# Patient Record
Sex: MALE | Race: WHITE | Employment: FULL TIME | URBAN - METROPOLITAN AREA
[De-identification: names, ages, dates, MRNs, and addresses within clinical notes are randomized per-mention and may not be internally consistent; named-entity substitution may affect disease eponyms.]

---

## 2020-06-03 ENCOUNTER — TELEPHONE (OUTPATIENT)
Dept: CARDIOLOGY CLINIC | Age: 20
End: 2020-06-03

## 2020-07-23 ENCOUNTER — OFFICE VISIT (OUTPATIENT)
Dept: CARDIOLOGY CLINIC | Age: 20
End: 2020-07-23
Payer: COMMERCIAL

## 2020-07-23 VITALS
SYSTOLIC BLOOD PRESSURE: 138 MMHG | HEART RATE: 94 BPM | OXYGEN SATURATION: 99 % | DIASTOLIC BLOOD PRESSURE: 60 MMHG | WEIGHT: 161 LBS

## 2020-07-23 PROCEDURE — 99204 OFFICE O/P NEW MOD 45 MIN: CPT | Performed by: INTERNAL MEDICINE

## 2020-07-23 RX ORDER — AMLODIPINE BESYLATE 10 MG/1
10 TABLET ORAL DAILY
COMMUNITY
Start: 2020-05-22

## 2020-07-23 RX ORDER — AMILORIDE HYDROCHLORIDE 5 MG/1
5 TABLET ORAL 2 TIMES DAILY
COMMUNITY
Start: 2020-05-22

## 2020-07-23 RX ORDER — LISINOPRIL 10 MG/1
10 TABLET ORAL DAILY
COMMUNITY

## 2020-07-23 NOTE — PROGRESS NOTES
Ov DR Enrique Music consult  HTN since senior in school   Sees  [place] [address] syndrome  Since 2016. 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 Did drink more caffeine   At that time  Cut down now. No chest pain or sob  No dizziness. Since little had heart   Murmur, bicuspid   Aortic valve. No family heart problems  Has 2 sisters   Age 24 twin going to [employer] for accounting and age 25  Will be Going to college for  dental hygienists   Doesn't drink or smoke. Works at [employer]. Pt was going to college   But then started this job. Father works MTD   Mother works   DR Asad Mao   In billing. Bedside echo done.  talked with mom   On phone in room. Will see in 1 year with   Echo at [facility].

## 2020-07-28 NOTE — PROGRESS NOTES
Artis Rice M.D. [address] [place] [address]  [phone]        2020        Celina Orr MD  [facility] [address]  [place] [address]    RE:   Crystal Sharpe  :  2000    Dear Dr. Cassius Gonzalez:    CHIEF COMPLAINT:  1. Possible bicuspid aortic valve. 2.  Hypertension. 3.  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 HISTORY OF PRESENT ILLNESS:  I had the pleasure of seeing Crystal Sharpe in our office on 2020. He is a pleasant 21-year-old gentleman, who has a long history of hypertension. He had been followed by Dr. Omar Gillette in [place], who is a pediatric cardiologist.  He has a long history of hypertension. He also had mild conduction abnormalities with first-degree and second-degree type 1 AV block. He has a history of a possible bicuspid aortic valve. This had been noted when he was 7or 5years old. However, another echocardiogram when he was 11 or 16, was felt to be not bicuspid aortic valve. He was seen by Dr. Danita Ngo on 2016, and at that time, he recommended a nephrology workup. He was seen on 2016, by Dr. Faisal Grace, a pediatric nephrologist.  He recommended limiting his salt to 2500 mg a day. He had a full renal workup by Dr. Faisal Grace. 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 He scheduled a renal ultrasound, which was unremarkable. He had a followup visit with Dr. Faisal Grace at [facility].  His evaluation had showed a low to normal potassium, low renin and low aldosterone level, all consistent with Liddle syndrome. Echocardiogram did not show LVH and there was no evidence of a bicuspid aortic valve. He was started on amiloride 5 mg daily on 2016. On 2018, he established with Dr. Cheyenne Collins for his Liddle syndrome. He was placed also on amlodipine 10 mg and 15 mg daily. He is asking to see me to follow his cardiac care.   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 He did have an echocardiogram on 05/23/2020, in [facility], and at that time, it was read as a probable bicuspid aortic valve. There was trace mitral and tricuspid regurgitation. His ejection fraction was normal at 65%. He was seen in the emergency room on 05/23/2020, for heart palpitations. He had been working in the [employer]. 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 It started several months ago. At that time, he was on the road working and training. He was working on the bridge with a large crane. His palpitations worsened at that time. When I reproduced the sound, it appeared that he was having single extrasystoles. 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 He likes this job, and again, he feels better with less palpitations. He denies any chest pain or chest discomfort. He has had no PND, orthopnea and no unusual pedal edema. His blood pressure has been under fairly good control. He has had no syncope or near syncope. No chest pain or chest discomfort and no dizziness. CARDIAC RISK FACTORS:  Hypertension:  Positive. Other Family Members:  Negative. Smoking:  Negative. Peripheral Vascular Disease:  Negative. Diabetes:  Negative. Hyperlipidemia:  Negative. MEDICATIONS AT HOME:  He is currently on amiloride 5 mg b.i.d., Norvasc 10 mg daily, lisinopril 10 mg daily. PAST MEDICAL HISTORY:  1. He has a history of Liddle syndrome. 2.  He has had hypertension since he was 14years old. 3.  He was told he had a bicuspid aortic valve when he was young, 7or 5years old, and at 14, he was told he did not have a bicuspid aortic valve, and on his last echo in 05/2020 read at [facility] suggested that he did have bicuspid aortic valve. 4.  He has hypertensive retinopathy.   Lisinopril was started in for allowing me the privilege of seeing Miguel Stout. If you have any questions on my thoughts, please do not hesitate to contact me.      Sincerely,      Kun Salgado    D: 07/26/2020 22:12:34     T: 07/27/2020 2:42:59     [email or web address]  Job#: [id number]   Doc#: [id number]

## 2021-06-08 DIAGNOSIS — Q23.1 BICUSPID AORTIC VALVE: ICD-10-CM

## 2021-07-09 DIAGNOSIS — Q23.1 BICUSPID AORTIC VALVE: ICD-10-CM

## 2021-07-09 DIAGNOSIS — R00.2 HEART PALPITATIONS: ICD-10-CM

## 2021-07-09 DIAGNOSIS — R53.83 FATIGUE, UNSPECIFIED TYPE: ICD-10-CM

## 2021-07-09 DIAGNOSIS — E55.9 VITAMIN D DEFICIENCY: ICD-10-CM

## 2021-07-09 DIAGNOSIS — I10 HYPERTENSION, UNSPECIFIED TYPE: ICD-10-CM

## 2021-07-10 ENCOUNTER — HOSPITAL ENCOUNTER (OUTPATIENT)
Age: 21
Discharge: HOME | End: 2021-07-10
Payer: COMMERCIAL

## 2021-07-10 DIAGNOSIS — Q23.1 BICUSPID AORTIC VALVE: ICD-10-CM

## 2021-07-10 DIAGNOSIS — E55.9 VITAMIN D DEFICIENCY: ICD-10-CM

## 2021-07-10 DIAGNOSIS — I10 HYPERTENSION, UNSPECIFIED TYPE: ICD-10-CM

## 2021-07-10 DIAGNOSIS — R53.83 FATIGUE, UNSPECIFIED TYPE: ICD-10-CM

## 2021-07-10 DIAGNOSIS — R00.2 HEART PALPITATIONS: ICD-10-CM

## 2021-07-10 LAB
ABSOLUTE EOS #: 0.1 K/UL (ref 0–0.4)
ABSOLUTE IMMATURE GRANULOCYTE: NORMAL K/UL (ref 0–0.3)
ABSOLUTE LYMPH #: 2.9 K/UL (ref 1–4.8)
ABSOLUTE MONO #: 0.5 K/UL (ref 0–1)
ALBUMIN SERPL-MCNC: 4.6 G/DL (ref 3.5–5.2)
ALBUMIN/GLOBULIN RATIO: ABNORMAL (ref 1–2.5)
ALP BLD-CCNC: 110 U/L (ref 40–129)
ALT SERPL-CCNC: 49 U/L (ref 5–41)
ANION GAP SERPL CALCULATED.3IONS-SCNC: 8 MMOL/L (ref 9–17)
AST SERPL-CCNC: 26 U/L
BASOPHILS # BLD: 0 % (ref 0–2)
BASOPHILS ABSOLUTE: 0 K/UL (ref 0–0.2)
BILIRUB SERPL-MCNC: 0.39 MG/DL (ref 0.3–1.2)
BUN BLDV-MCNC: 17 MG/DL (ref 6–20)
BUN/CREAT BLD: 16 (ref 9–20)
CALCIUM SERPL-MCNC: 9.7 MG/DL (ref 8.6–10.4)
CHLORIDE BLD-SCNC: 107 MMOL/L (ref 98–107)
CHOLESTEROL/HDL RATIO: 2.7
CHOLESTEROL: 128 MG/DL
CO2: 26 MMOL/L (ref 20–31)
CREAT SERPL-MCNC: 1.05 MG/DL (ref 0.7–1.2)
DIFFERENTIAL TYPE: YES
EOSINOPHILS RELATIVE PERCENT: 2 % (ref 0–5)
GFR AFRICAN AMERICAN: >60 ML/MIN
GFR NON-AFRICAN AMERICAN: >60 ML/MIN
GFR SERPL CREATININE-BSD FRML MDRD: ABNORMAL ML/MIN/{1.73_M2}
GFR SERPL CREATININE-BSD FRML MDRD: ABNORMAL ML/MIN/{1.73_M2}
GLUCOSE BLD-MCNC: 93 MG/DL (ref 70–99)
HCT VFR BLD CALC: 45.4 % (ref 41–53)
HDLC SERPL-MCNC: 48 MG/DL
HEMOGLOBIN: 16 G/DL (ref 13.5–17.5)
IMMATURE GRANULOCYTES: NORMAL %
LDL CHOLESTEROL: 66 MG/DL (ref 0–130)
LYMPHOCYTES # BLD: 41 % (ref 13–44)
MAGNESIUM: 1.9 MG/DL (ref 1.6–2.6)
MCH RBC QN AUTO: 30.2 PG (ref 26–34)
MCHC RBC AUTO-ENTMCNC: 35.1 G/DL (ref 31–37)
MCV RBC AUTO: 86 FL (ref 80–100)
MONOCYTES # BLD: 7 % (ref 5–9)
NRBC AUTOMATED: NORMAL PER 100 WBC
PATIENT FASTING?: YES
PDW BLD-RTO: 12.8 % (ref 12.1–15.2)
PLATELET # BLD: 214 K/UL (ref 140–450)
PLATELET ESTIMATE: NORMAL
PMV BLD AUTO: NORMAL FL (ref 6–12)
POTASSIUM SERPL-SCNC: 4.5 MMOL/L (ref 3.7–5.3)
RBC # BLD: 5.28 M/UL (ref 4.5–5.9)
RBC # BLD: NORMAL 10*6/UL
SEG NEUTROPHILS: 50 % (ref 39–75)
SEGMENTED NEUTROPHILS ABSOLUTE COUNT: 3.5 K/UL (ref 2.1–6.5)
SODIUM BLD-SCNC: 141 MMOL/L (ref 135–144)
TOTAL PROTEIN: 7.5 G/DL (ref 6.4–8.3)
TRIGL SERPL-MCNC: 68 MG/DL
TSH SERPL DL<=0.05 MIU/L-ACNC: 1.08 MIU/L (ref 0.3–5)
VITAMIN D 25-HYDROXY: 40.1 NG/ML (ref 30–100)
VLDLC SERPL CALC-MCNC: NORMAL MG/DL (ref 1–30)
WBC # BLD: 7 K/UL (ref 3.5–11)
WBC # BLD: NORMAL 10*3/UL

## 2021-07-10 PROCEDURE — 85025 COMPLETE CBC W/AUTO DIFF WBC: CPT

## 2021-07-10 PROCEDURE — 84443 ASSAY THYROID STIM HORMONE: CPT

## 2021-07-10 PROCEDURE — 80053 COMPREHEN METABOLIC PANEL: CPT

## 2021-07-10 PROCEDURE — 80061 LIPID PANEL: CPT

## 2021-07-10 PROCEDURE — 36415 COLL VENOUS BLD VENIPUNCTURE: CPT

## 2021-07-10 PROCEDURE — 83735 ASSAY OF MAGNESIUM: CPT

## 2021-07-10 PROCEDURE — 82306 VITAMIN D 25 HYDROXY: CPT

## 2021-07-12 DIAGNOSIS — R53.83 FATIGUE, UNSPECIFIED TYPE: ICD-10-CM

## 2021-07-12 DIAGNOSIS — R00.2 HEART PALPITATIONS: ICD-10-CM

## 2021-07-12 DIAGNOSIS — I10 HYPERTENSION, UNSPECIFIED TYPE: ICD-10-CM

## 2021-07-12 DIAGNOSIS — Q23.1 BICUSPID AORTIC VALVE: ICD-10-CM

## 2021-07-12 DIAGNOSIS — E55.9 VITAMIN D DEFICIENCY: ICD-10-CM

## 2021-07-15 ENCOUNTER — OFFICE VISIT (OUTPATIENT)
Dept: CARDIOLOGY CLINIC | Age: 21
End: 2021-07-15
Payer: COMMERCIAL

## 2021-07-15 VITALS
SYSTOLIC BLOOD PRESSURE: 130 MMHG | HEART RATE: 81 BPM | WEIGHT: 163 LBS | DIASTOLIC BLOOD PRESSURE: 70 MMHG | OXYGEN SATURATION: 98 %

## 2021-07-15 DIAGNOSIS — I10 ESSENTIAL HYPERTENSION: Primary | ICD-10-CM

## 2021-07-15 PROCEDURE — 99214 OFFICE O/P EST MOD 30 MIN: CPT | Performed by: INTERNAL MEDICINE

## 2021-07-15 RX ORDER — BUSPIRONE HYDROCHLORIDE 10 MG/1
10 TABLET ORAL 2 TIMES DAILY
COMMUNITY

## 2021-07-15 NOTE — PROGRESS NOTES
Ov Dr. Mary Garza for one year follow up   No hospitalizations/procedures/er visits  occ palpitations comes and goes anytime   No chest pain or heaviness  No sob   Still at [employer]   Twin Qian Soto still at [employer]   Other Sister not in dental hygienist now but   accounting-[employer]- Norval Blizzard is his girlfriend -in college -?dental hygienist   Had reflux the other day/throat hurt had a little amount of blood come up       No changes    Follow up in one year

## 2021-07-20 NOTE — PROGRESS NOTES
Evangelina Ahumada, M.D. [address] [place] [address]  [phone]    July 15, 2021    MD Anna Amoss [address]  [facility], [place] [address]    RE:   Rajwinder Varner  :  2000      Dear Dr. Gia Rashid:    CHIEF COMPLAINT:  1. History of possible bicuspid aortic valve. although by echocardiogram at [facility] in [facility] on 2021, states there is \"well-visualized trileaflet aortic valve without stenosis or insufficiency\" with an EF of 69% and normal right heart chambers. 2.  Liddle syndrome, followed by Dr. Theresa Charles. HISTORY OF PRESENT ILLNESS:  I had the pleasure of seeing Rajwinder Varner in our office on 07/15/2021. He is a 79-year-old gentleman who has a long history of hypertension. He had been followed by Dr. Elizabet Hernandez in [place], who is a cardiologist.  He has intermittent first-degree block and second-degree type 1 Wenckebach AV block. He also has a history of a possible bicuspid aortic valve. This had been noted when he was 7or 5years old; however, on an echocardiogram when he was 11 or 15, it was felt to be a trileaflet valve. He had a renal workup by Dr. Man Haji and felt that he had Liddle syndrome (low PRA and aldosterone). He has a low to normal potassium, and low renin and low aldosterone levels. He is now followed by Dr. Theresa Charles. He has been on amlodipine for his hypertension. I saw him on 2020. At that time, his blood pressure was well controlled. He had a systolic ejection murmur in the aortic region, which was a flow murmur. I felt that he probably had a bicuspid valve. Again, I sent him for an echocardiogram at [facility] in [facility] in preparation for his yearly visit today. His echocardiogram was read as normal LV function, with an EF of 69%, with normal chamber sizes. Again, he had a \"well-visualized trileaflet aortic valve without stenosis or insufficiency. \"    He has done well over this last year. He has had no hospitalizations or procedures. He is here with his mom and he lives with his parents. He works at [employer] in [place]. He has occasional palpitations that would come and go, and are not bothersome to him. He has had no syncope or near syncope, lightheadedness or dizziness. He denies any chest pain or chest discomfort. He overall looks like he is doing excellent. He looks much more relaxed than he was last year and I know that you have placed him on BuSpar, which has markedly helped his anxiety. He has a girlfriend named Nirmal Go, who is in college for dental hygienist.  Katie Arreola does not exercise but stays active. His blood pressure has been well controlled, as is his Liddle syndrome. CARDIAC RISK FACTORS:  Hypertension:  Positive. Other Family Members:  Negative. Smoking:  Negative. Peripheral Vascular Disease:  Negative. Diabetes:  Negative. Hyperlipidemia:  Negative. MEDICATIONS AT THIS TIME:  He is on amiloride (Midamor) 5 mg b.i.d., Norvasc 10 mg daily, BuSpar 10 mg b.i.d., lisinopril 10 mg daily. PAST MEDICAL AND SURGICAL HISTORY:  1.  Liddle syndrome. 2.  Hypertension since he was 14.  3.  History of possible bicuspid aortic valve, [facility] clearly showed trileaflet of his aortic valve. 4.  Hypertensive retinopathy. 5.  No renal artery stenosis. 6.  No surgeries. FAMILY HISTORY:  No significant heart disease in his family. His mother is 52. Father is 52. SOCIAL HISTORY:  His twin sister, Adolfo Edmonds, is in [place] in accounting. His other sister, Mercedes Vasquez, is in accounting at [employer]. He has a girlfriend, Nirmal Go, who is a dental hygienist.  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 Father works at [employer]. Mother works for [employer]. He does not smoke or drink alcohol. Stays active. REVIEW OF SYSTEMS:  Cardiac as above.   Other systems reviewed including constitutional, eyes, ears, nose and throat, cardiovascular, respiratory, GI, , musculoskeletal, integumentary, neurologic, endocrine, hematologic and allergic/immunologic are negative except for what is described above. No weight loss or weight gain. No change in bowel habits, no blood in stools. No fevers, sweats or chills. PHYSICAL EXAMINATION:  VITAL SIGNS:  His blood pressure was 130/70 with a heart rate of 81 and regular. Respiratory rate 18. O2 saturation 98%. Weight 158 pounds. GENERAL:  He is a pleasant 54-year-old gentleman. Denied pain. He was oriented to person, place and time. Answered questions appropriately. SKIN:  No unusual skin changes. HEENT:  The pupils are equally round and intact. Mucous membranes were dry. NECK:  No JVD. Good carotid pulses. No carotid bruits. No lymphadenopathy or thyromegaly. CARDIOVASCULAR EXAM:  S1 and S2 were normal.  No S3 or S4. He has grade 2/grade 3 systolic ejection murmur. No diastolic murmur. PMI was normal.  No lift, thrust, or pericardial friction rub. LUNGS:  Quite clear to auscultation and percussion. ABDOMEN:  Soft and nontender. Good bowel sounds. EXTREMITIES:  Good femoral pulses. Good pedal pulses. No pedal edema. Skin was warm and dry. No calf tenderness. Nail beds pink. Good cap refill. PULSES:  Bilateral symmetrical radial, brachial and carotid pulses. No carotid bruits. Good femoral and pedal pulses. NEUROLOGIC EXAM:  Within normal limits. PSYCHIATRIC EXAM:  Within normal limits. LABORATORY DATA:  From 07/10/2021, sodium 141, potassium 4.5, BUN 17, creatinine 1.05, GFR greater than 60, magnesium was 1.9, calcium 9.7. Cholesterol 128, HDL 48, LDL 66, triglycerides 68. ALT was 49, AST was 26. TSH was 1.08. Vitamin D was 40.1. White count 7.10, with hemoglobin 16.0, and a platelet count of [phone],446.     EKG showed normal sinus rhythm, was normal.    Echocardiogram on 07/08/2021, showed trileaflet aortic valve with normal chambers sizes, with no aortic stenosis or insufficiency, no valve abnormalities. Chest x-ray on 07/08/2021, was normal.    IMPRESSION:  1. Systolic murmur, which is a flow murmur. 2.  Trileaflet aortic valve with no stenosis or insufficiency by an echocardiogram at [facility] in [place] on 07/08/2021, where it was noted \"well-visualized trileaflet aortic valve without stenosis or insufficiency. \"  3. Hypertension, well controlled. 4.  Liddle syndrome, under good control on amiloride. 5.  History of first-degree AV block and intermittent Wenckebach although his last EKG was normal at [facility]. PLAN:  1. I will see as needed. 2.  He will follow with Dr. Marciano Gonzalez for his hypertension. 3.  I would be glad to see him at any point in time in the future if any issues would come up. DISCUSSION:  Mini Reinoso does have a systolic murmur but it appears to be a flow murmur. He does not have a bicuspid aortic valve, which is much to my surprise. I have not seen the echo done at [facility], but again, it was clearly stated that it was a trileaflet aortic valve. I will see him as needed as his main issue is his hypertension and his Liddle syndrome. I enjoy very much seeing him and his mother. Again, I will be available if any cardiac issues would arise in the future. Thank you very much for allowing me the privilege of seeing Mini Reinoso. If you have any questions on my thoughts, please do not hesitate to contact me.      Sincerely,        Hyla Frankel    D: 07/19/2021 5:14:23     T: 07/19/2021 12:01:01     [email or web address]  Job#: [id number]   Doc#: [id number]

## 2021-07-23 ENCOUNTER — HOSPITAL ENCOUNTER (OUTPATIENT)
Age: 21
Discharge: HOME | End: 2021-07-23
Payer: COMMERCIAL

## 2021-07-23 LAB
CREATININE URINE: 66 MG/DL (ref 39–259)
TOTAL PROTEIN, URINE: <4 MG/DL

## 2021-07-23 PROCEDURE — 82570 ASSAY OF URINE CREATININE: CPT

## 2021-07-23 PROCEDURE — 84156 ASSAY OF PROTEIN URINE: CPT

## 2022-08-18 ENCOUNTER — TELEPHONE (OUTPATIENT)
Dept: CARDIOLOGY CLINIC | Age: 22
End: 2022-08-18

## 2022-08-18 NOTE — TELEPHONE ENCOUNTER
Prabhu Prater called to state that Brina Yang has a routine dental appoointment today at 3 PM.  She thinks that Dr. Letitia Rangel told them that Brina Yang no longer needs to pre medicate prior to his dental appointments. She is wanting to double check with Dr. Letitia Rangel.   Please call Prabhu Prater at [phone]